# Patient Record
Sex: FEMALE | Race: WHITE | NOT HISPANIC OR LATINO | Employment: OTHER | URBAN - METROPOLITAN AREA
[De-identification: names, ages, dates, MRNs, and addresses within clinical notes are randomized per-mention and may not be internally consistent; named-entity substitution may affect disease eponyms.]

---

## 2023-01-18 ENCOUNTER — APPOINTMENT (OUTPATIENT)
Dept: RADIOLOGY | Facility: MEDICAL CENTER | Age: 68
End: 2023-01-18
Attending: EMERGENCY MEDICINE
Payer: MEDICARE

## 2023-01-18 ENCOUNTER — HOSPITAL ENCOUNTER (EMERGENCY)
Facility: MEDICAL CENTER | Age: 68
End: 2023-01-18
Attending: EMERGENCY MEDICINE
Payer: MEDICARE

## 2023-01-18 VITALS
WEIGHT: 220 LBS | DIASTOLIC BLOOD PRESSURE: 67 MMHG | HEART RATE: 93 BPM | OXYGEN SATURATION: 96 % | RESPIRATION RATE: 16 BRPM | BODY MASS INDEX: 33.34 KG/M2 | SYSTOLIC BLOOD PRESSURE: 151 MMHG | TEMPERATURE: 97.6 F | HEIGHT: 68 IN

## 2023-01-18 DIAGNOSIS — S82.892A CLOSED FRACTURE OF LEFT ANKLE, INITIAL ENCOUNTER: ICD-10-CM

## 2023-01-18 PROCEDURE — 99284 EMERGENCY DEPT VISIT MOD MDM: CPT

## 2023-01-18 PROCEDURE — 73610 X-RAY EXAM OF ANKLE: CPT | Mod: LT

## 2023-01-18 ASSESSMENT — FIBROSIS 4 INDEX: FIB4 SCORE: 2.14

## 2023-01-18 NOTE — ED TRIAGE NOTES
"Chief Complaint   Patient presents with    Ankle Injury     Pt fell on the ice approximately 8 hrs ago and states her ankle rolled underneath her. Pt woke up today with swollen ankle and unable to to bear weight. CMS intact. Pt endorses mild tingling.        BP (!) 148/69   Pulse 93   Temp 36.4 °C (97.6 °F) (Temporal)   Resp 16   Ht 1.727 m (5' 8\")   Wt 99.8 kg (220 lb)   SpO2 96%   BMI 33.45 kg/m²     "

## 2023-01-18 NOTE — DISCHARGE INSTRUCTIONS
DX-ANKLE 3+ VIEWS LEFT (Final result)  Result time 01/18/23 11:34:03  Final result                Impression:      1.  There is a minimally displaced oblique fracture of the distal left fibula.            Narrative:      1/18/2023 11:13 AM     HISTORY/REASON FOR EXAM:  Fall with left ankle pain.       TECHNIQUE/EXAM DESCRIPTION AND NUMBER OF VIEWS:  3 views of the LEFT ankle.     COMPARISON: None     FINDINGS:     There is lateral ankle soft tissue swelling.     There is a minimally laterally displaced oblique fracture of the distal left fibula at the metadiaphyseal junction. The tibia appears grossly intact.     The alignment of the ankle is within normal limits.     There is minimal degenerative change in the medial tibiotalar joint space. There is a small posterior superior calcaneal enthesophyte.

## 2023-01-18 NOTE — ED PROVIDER NOTES
ED Provider Note    CHIEF COMPLAINT  Chief Complaint   Patient presents with    Ankle Injury     Pt fell on the ice approximately 8 hrs ago and states her ankle rolled underneath her. Pt woke up today with swollen ankle and unable to to bear weight. CMS intact. Pt endorses mild tingling.        EXTERNAL RECORDS REVIEWED  Reviewed care everywhere medical records from [facility]    HPI/ROS  LIMITATION TO HISTORY   None  OUTSIDE HISTORIAN(S):  None    Gabriela De La Torre is a 67 y.o. female who presents for evaluation of acute injury to the left ankle.  The patient is a very pleasant 67-year-[place]  She is here on business and admits to having a few alcoholic beverages last night and slipped on some ice and her ankle on the left side rolled.  She was initially able to bear weight last night and went to bed and woke up and noticed the swelling and pain was worse than she expected and was unable to bear weight without significant pain.  She specifically denies any injury to the head neck chest abdomen or pelvis.  No numbness weakness or tingling.  She does not take any blood thinners.  No numbness weakness or tingling.    PAST MEDICAL HISTORY       SURGICAL HISTORY  patient denies any surgical history    FAMILY HISTORY  History reviewed. No pertinent family history.  Noncontributory  SOCIAL HISTORY  Social History     Tobacco Use    Smoking status: Never    Smokeless tobacco: Never   Vaping Use    Vaping Use: Never used   Substance and Sexual Activity    Alcohol use: Yes     Comment: occ    Drug use: Never    Sexual activity: Not on file     Social alcohol  CURRENT MEDICATIONS  Home Medications       Reviewed by Radha Knowles R.N. (Registered Nurse) on 01/18/23 at 1044  Med List Status: Not Addressed     Medication Last Dose Status        Patient Edison Taking any Medications                           ALLERGIES  No Known Allergies    PHYSICAL EXAM  VITAL SIGNS: BP (!)  "148/69   Pulse 93   Temp 36.4 °C (97.6 °F) (Temporal)   Resp 16   Ht 1.727 m (5' 8\")   Wt 99.8 kg (220 lb)   SpO2 96%   BMI 33.45 kg/m²    Pulse ox interpretation: I interpret this pulse ox as normal.  Constitutional: Alert and oriented x 3, no Distress  HEENT: Atraumatic normocephalic, pupils are equal round reactive to light extraocular movements are intact. The nares is clear, external ears are normal, mouth shows moist mucous membranes normal dentition for age  Neck: Supple, no JVD no tracheal deviation  Cardiovascular: Regular rate and rhythm no murmur rub or gallop 2+ pulses peripherally x4  Thorax & Lungs: No respiratory distress, no wheezes rales or rhonchi, No chest tenderness.   GI: Soft nontender nondistended positive bowel sounds, no peritoneal signs  Skin: Warm dry no acute rash or lesion  Musculoskeletal: Upper extremity exam is notable for no deformity or bony tenderness on the clavicles proximal humerus wrist elbows and hands.  Left lower extremity exam is notable for point tenderness on the lateral malleolus.  No large effusion no midfoot instability.  No tenderness over the proximal fibular head.  Compartments of the leg are soft.  Contralateral right leg exam is entirely normal  Neurologic: Cranial nerves III through XII are grossly intact no sensory deficit no cerebellar dysfunction   Psychiatric: Appropriate affect for situation at this time          RADIOLOGY  I have independently interpreted the diagnostic imaging associated with this visit and am waiting the final reading from the radiologist.   My preliminary interpretation is a follows: Oblique nondisplaced fracture of the left distal fibula mortise is intact  DX-ANKLE 3+ VIEWS LEFT   Final Result      1.  There is a minimally displaced oblique fracture of the distal left fibula.          COURSE & MEDICAL DECISION MAKING    ED Observation Status? No; Patient does not meet criteria for ED Observation.     INITIAL ASSESSMENT AND " PLAN  Care Narrative: This is a very pleasant 67-year-old female who sustained a ground-level fall last night with acute left ankle injury.  There is no report of any injury to the head neck chest abdomen or pelvis.  Her exam is concerning for tenderness over the lateral malleolus and radiograph confirmed a minimally displaced oblique fracture of the distal left fibula.  The mortise appears to be intact this is not an open fracture or terribly unstable.  We will set her up for an orthopedic long-leg boot and crutches.  She has a PCP back home and I recommended that she follow-up with her PCP for orthopedic referral.  I counseled her to take ibuprofen and Tylenol for pain and to take the boot off and ice 20 minutes on and off of these 4-5 times daily    ADDITIONAL PROBLEM LIST AND DISPOSITION      I have discussed management of the patient with the following physicians and NIA's: None    Discussion of management with other QHP or appropriate source(s): None    Escalation of care considered, and ultimately not performed:acute inpatient care management, however at this time, the patient is most appropriate for outpatient management    Barriers to care at this time, including but not limited to: None      FINAL DIAGNOSIS  Acute closed minimally displaced oblique fracture of the distal left fibula           Electronically signed by: Db Dumas M.D., 1/18/2023 11:10 AM